# Patient Record
Sex: FEMALE | Race: WHITE
[De-identification: names, ages, dates, MRNs, and addresses within clinical notes are randomized per-mention and may not be internally consistent; named-entity substitution may affect disease eponyms.]

---

## 2021-08-18 ENCOUNTER — HOSPITAL ENCOUNTER (OUTPATIENT)
Dept: HOSPITAL 47 - RADNMMAIN | Age: 66
Discharge: HOME | End: 2021-08-18
Attending: FAMILY MEDICINE
Payer: MEDICARE

## 2021-08-18 DIAGNOSIS — E21.3: Primary | ICD-10-CM

## 2021-08-18 DIAGNOSIS — R92.1: ICD-10-CM

## 2021-08-18 DIAGNOSIS — R92.2: ICD-10-CM

## 2021-08-18 PROCEDURE — 77066 DX MAMMO INCL CAD BI: CPT

## 2021-08-18 PROCEDURE — 78071 PARATHYRD PLANAR W/WO SUBTRJ: CPT

## 2021-08-18 PROCEDURE — 77062 BREAST TOMOSYNTHESIS BI: CPT

## 2021-08-18 NOTE — NM
EXAMINATION TYPE: NM parathyroid w/spect

 

DATE OF EXAM: 8/18/2021

 

 COMPARISON: NONE

 

HISTORY: Hyperparathyroidism. Hypercalcemia. 

 

TECHNIQUE:

Following administration of 25.5 mCi Tc99m Sestamibi. Anterior projection images of the neck and ches
t were obtained 0 minutes and 3 hours post injection.  SPECT images of the neck and chest were obtain
ed and reconstructed in three axes.

 

FINDINGS:

 

Thyroid tracer washout: Delayed images demonstrate near-complete tracer washout from the thyroid.

 

Parathyroid uptake: Persistent suspicious radiotracer uptake lower pole of the right thyroid on delay
ed images.

 

Normal uptake: There is physiological tracer uptake in the salivary glands and thyroid glands initial
ly.

 

IMPRESSION: Probable lower pole right parathyroid adenoma. Consider ultrasound correlation.

## 2021-08-23 NOTE — MM
Reason for exam: additional evaluation requested from prior study.

Last mammogram was performed 2 years and 2 months ago.



History:

Patient is postmenopausal and has history of breast cancer at age 58.

Lumpectomy of the left breast, 2014.  Radiation therapy of the left breast, 2014. 

Malignant excisional biopsy of the left breast, 2013.

Took estrogen for 8 years.  Took antineoplastic for 5 years.



Physical Findings:

Nurse did not find any significant physical abnormalities on exam.



MG 3D Diag Mammo W/Cad ANJANA

Bilateral CC and MLO view(s) were taken.

Prior study comparison: June 6, 2019, mammogram, performed at [facility].  May 17, 

2018, mammogram, performed at [facility].  May 11, 2017, mammogram, performed at 

[facility].  October 10, 2016, mammogram, performed at [facility].  April 25, 2016, 

mammogram, performed at [facility].  February 12, 2015, mammogram, performed at 

[facility].  July 24, 2014, mammogram, performed at [facility].

The breast tissue is heterogeneously dense. This may lower the sensitivity of 

mammography.  Stable benign calcifications. Stable post operative lumpectomy 

changes left breast.

No significant new findings when compared with previous films.



These results were verbally communicated with the patient on 8/23/21.





ASSESSMENT: Benign, BI-RAD 2



RECOMMENDATION:

Follow-up diagnostic mammogram of both breasts in 1 year.

## 2021-11-10 ENCOUNTER — HOSPITAL ENCOUNTER (OUTPATIENT)
Dept: HOSPITAL 47 - RADUSWWP | Age: 66
Discharge: HOME | End: 2021-11-10
Payer: MEDICARE

## 2021-11-10 DIAGNOSIS — E04.2: Primary | ICD-10-CM

## 2021-11-10 PROCEDURE — 76536 US EXAM OF HEAD AND NECK: CPT

## 2021-11-10 NOTE — US
EXAMINATION TYPE: US thyroid st tissue head/neck

 

DATE OF EXAM: 11/10/2021

 

COMPARISON: NM 8/18/2021

 

CLINICAL HISTORY: 66-year-old female E83.52 Hypercalcemia.

 

TECHNIQUE: Multiple sonographic images of the thyroid gland are obtained.

 

FINDINGS:

 

GLAND SIZE:

 

Right Lobe: 3.9 x 1.2 x 1.7 cm

** Overall Parenchyma:  heterogenous

Left Lobe: 4.6 x 1.1 x 1.2 cm

** Overall Parenchyma:  heterogeneous

Isthmus Thickness:  0.4 cm

 

NODULES

 

RIGHT:   # of nodules measured on right: 1

1.  0.7 X 0.4  x 0.6 cm, mid mid, solid or almost completely solid, hypoechoic TR 4 nodule, which is 
wider than tall, with smooth margins, without echogenic foci.

 ** Prior size: no prior 

**Multiple other subcentimeter nodules, largest measured. 2 adjacent very hypoechoic nodules are pres
ent far posteriorly at the mid to lower pole measuring 7 mm and 5 mm.

 

LEFT:    # of nodules measured on left:  2

1.  1.0 X 0.6  x 0.7 cm, upper mid, solid or almost completely solid, hypoechoic TR 5 nodule, which i
s wider than tall, with smooth margins, with echogenic foci.

 ** Prior size: no prior 

 

2.  0.7 X 0.7  x 0.7 cm, lower mid,  solid or almost completely solid, hypoechoic TR 5 nodule, which 
is taller than wide, with smooth margins, with echogenic foci.

 ** Prior size: no prior 

 

 

ISTHMUS:    # of nodules measured in the isthmus:  0

 

Bilateral neck scanned, no evidence of lymphadenopathy.

 

Sonographer notes: Incidental note is made of paired parathyroid adenoma's right side measuring 0.7 x
 0.4 x 0.4 cm and 0.5 x 0.2 x 0.4 cm. 

 

 

 

IMPRESSION:

 

1. Possible paired, adjacent parathyroid adenomas posteriorly at the level of the mid to lower right 
thyroid lobe. This seems to correspond to the area of increased activity on the parathyroid scan.

2. A couple TR5 nodules in the left lobe measuring 10 mm and 7 mm. FNA can be considered for the left
-sided 10 mm nodule. The other nodules should be followed.

## 2022-11-08 ENCOUNTER — HOSPITAL ENCOUNTER (OUTPATIENT)
Dept: HOSPITAL 47 - RADMAMWWP | Age: 67
Discharge: HOME | End: 2022-11-08
Attending: FAMILY MEDICINE
Payer: MEDICARE

## 2022-11-08 DIAGNOSIS — Z12.2: Primary | ICD-10-CM

## 2022-11-08 DIAGNOSIS — Z78.0: ICD-10-CM

## 2022-11-08 DIAGNOSIS — Z19.1: ICD-10-CM

## 2022-11-08 DIAGNOSIS — R91.8: ICD-10-CM

## 2022-11-08 PROCEDURE — 77066 DX MAMMO INCL CAD BI: CPT

## 2022-11-08 PROCEDURE — 71271 CT THORAX LUNG CANCER SCR C-: CPT

## 2022-11-08 PROCEDURE — 77062 BREAST TOMOSYNTHESIS BI: CPT

## 2022-11-08 NOTE — MM
Reason for Exam: Follow-up at short interval from prior study. 

Last mammogram was performed 1 year(s) and 3 month(s) ago. 





Patient History: 

Menarche at age 10. First Full-Term Pregnancy at age 15. Hysterectomy at age 40. Postmenopausal.

Breast cancer, age 58. Previous chest radiation therapy at age 58. Patient used Estrogen for 8

years. 2013, Malignant Excisional Biopsy on the left side. 2014, Lumpectomy on the Left side. 2014,

Radiation Therapy on the left side. 





Prior Study Comparison: 

3/22/2005 Bilateral Screening Mammogram, [facility]. 7/24/2014  Screening Mammogram, [facility]. 2/12/2015

Screening Mammogram, [facility]. 4/25/2016  Screening Mammogram, [facility]. 10/10/2016  Screening

Mammogram, [facility]. 5/11/2017  Screening Mammogram, [facility]. 5/17/2018  Screening Mammogram,

[facility]. 6/6/2019  Screening Mammogram, [facility]. 8/18/2021 Bilateral Diagnostic Mammogram, [facility]. 





Tissue Density: 

There are scattered fibroglandular densities.





Findings: 

Analyzed By CAD. 

Pattern appears stable. Postsurgical changes are within the left breast. Postsurgical area contains

some punctate calcifications.

No suspicious groups of microcalcifications, spiculated or lobular masses, architectural distortion

or other secondary signs of malignancy are mammographically apparent. 





Overall Assessment: Benign, BI-RAD 2





Management: 

Screening Mammogram of both breasts in 1 year.

A negative mammogram report should not preclude additional follow up of suspicious palpable

abnormalities.

Patient should continue monthly self breast exam.

A clinical breast exam by your physician is recommended on an annual basis and results should be

correlated with mammographic findings.



Electronically signed and approved by: Spencer Dupont D.O. Radiologis

## 2022-11-08 NOTE — CTL
EXAMINATION TYPE:  CT Low Dose Lung

 

DATE OF EXAM ORDERED: 11/8/2022

 

HISTORY: . Lung cancer screening

 

CT DLP: 81.90 mGycm

CT CTDI: 2.20 mGy

Automated exposure control for dose reduction was used.

 

SCREENING VISIT:

 

COMPARISON: None

 

TECHNIQUE: Low dose computed tomography scan was performed through the chest at 1 mm thick sections a
nd reconstructed images in multiple planes at 1 mm and 5 mm thick sections.

 

CT DIAGNOSTIC QUALITY: Satisfactory

 

FINDINGS:

Lungs are clear. There is no pleural effusion or pneumothorax. No focal pneumonia. No pleural or pare
nchymal calcifications. Mild emphysematous changes noted.

 

There is a 2 mm subpleural nodule right upper lobe axial image 84.

 

Heart size normal. Aorta of normal caliber. No significant coronary artery calcification.

 

There is a nodule seen within the left breast with calcification measuring 1.2 cm.

 

Hypertrophic and degenerative changes of the vertebral count. There is a right upper pole renal lesio
n measuring 1.2 cm and 9 Hounsfield units. Findings compatible with a Bosniak classification 1 simple
 cyst.

 

 

 

 IMPRESSION: 

1. 2 mm nodule subpleural right upper lobe has a benign appearance. No suspicious appearing nodules.

2. There is a 1.2 cm nodule left breast with calcification recommend mammogram.

 

CT LUNG RAD AND CT CHEST RECOMMENDATION: Lung-Rad 2 Benign Appearance or Behavior: Continue annual sc
reening with LDCT in 12 months.

 

S Modifier (other clinically significant findings): S

## 2022-12-01 ENCOUNTER — HOSPITAL ENCOUNTER (OUTPATIENT)
Dept: HOSPITAL 47 - RADUSWWP | Age: 67
Discharge: HOME | End: 2022-12-01
Attending: INTERNAL MEDICINE
Payer: MEDICARE

## 2022-12-01 DIAGNOSIS — Z53.9: Primary | ICD-10-CM

## 2022-12-01 DIAGNOSIS — E04.2: Primary | ICD-10-CM

## 2022-12-01 PROCEDURE — 76536 US EXAM OF HEAD AND NECK: CPT

## 2022-12-01 NOTE — US
EXAMINATION TYPE: US thyroid st tissue head/neck

 

DATE OF EXAM: 12/1/2022

 

COMPARISON: 11/10/2021

 

CLINICAL HISTORY: 67-year-old female E04.1 NONTOXIC SINGLE THYROID NODULE. F/u exam

 

Technique: Multiple sonographic images of the thyroid gland are obtained.

 

FINDINGS:

 

GLAND SIZE:

 

Right Lobe: 3.4 x 1.3 x 1.6 cm

** Overall Parenchyma:  heterogenous

Left Lobe: 4.2 x 1.2 x 1.5 cm

** Overall Parenchyma:  heterogeneous

Isthmus Thickness:  0.3 cm

 

NODULES

 

RIGHT:   # of nodules measured on right: 2 - multiple nodules, measured largest

1.  0.7 X 0.7  x 0.5 cm, mid , solid or almost completely solid, hypoechoic TR4 nodule, which is wide
r than tall, with smooth margins, without echogenic foci.

 ** Prior size: 0.7 x 0.6  x 0.4 cm 

 

2.  0.8 X 0.9  x 0.8 cm, Inferior to thyroid gland  cystic or almost completely cystic, anechoic nodu
le, which is wider than tall, with smooth margins, with echogenic foci. Suspected colloid cyst.

 ** Prior size: 1.2 x 0.7  x 0.4 cm - previously measured as 2 separate, I combined measurements toda
y

 

LEFT:    # of nodules measured on left: 1 - measured largest

1.  1.1 X 0.8  x 0.6 cm, upper , solid or almost completely solid, hypoechoic TR4 nodule, which is wi
lo than tall, with smooth margins, without echogenic foci.

 ** Prior size: 1.0 x 0.7  x 0.5 cm 

 

 

ISTHMUS:    # of nodules measured in the isthmus:  0

 

Bilateral neck scanned, no evidence of lymphadenopathy.

 

 

 

IMPRESSION:

Consider multinodular goiter. Dominant nodules are measured. The 7 mm TR4 nodule at the right midpole
 is unchanged. At the left upper pole, the 1.1 cm here for nodule is also relatively unchanged. Ongoi
ng follow-up as clinically indicated.

## 2022-12-01 NOTE — MM
Reason for Exam: Additional evaluation requested from abnormal screening. 

Last screening mammogram was performed less than 1 month ago.





Patient History: 

Menarche at age 10. First Full-Term Pregnancy at age 15. Hysterectomy at age 40. Postmenopausal.

Patient has history of breast feeding. Breast cancer, left, age 58. Previous chest radiation therapy

at age 58. Patient used Estrogen for 8 years. 2013, Malignant Excisional Biopsy on the left side.

2014, Lumpectomy on the Left side. 2014, Radiation Therapy on the left side. 





Prior Study Comparison: 

3/22/2005 Bilateral Screening Mammogram, [facility]. 7/24/2014  Screening Mammogram, [facility]. 2/12/2015

Screening Mammogram, [facility]. 4/25/2016  Screening Mammogram, [facility]. 10/10/2016  Screening

Mammogram, [facility]. 5/11/2017  Screening Mammogram, [facility]. 5/17/2018  Screening Mammogram,

[facility]. 6/6/2019  Screening Mammogram, [facility]. 8/18/2021 Bilateral Diagnostic Mammogram, [facility].

11/8/2022 Bilateral MG 3D diag mammo w/cad ANJANA, [facility]. 





Tissue Density: 

Left: There are scattered fibroglandular densities.





Findings: 

Analyzed By CAD. 

Redemonstrated 12:00 posterior depth postsurgical scar. There are increasing microcalcifications at

this site. On the lateral view, these appear coarse and heterogeneous but on the mag CC view, we

suspect early developing fat necrosis calcifications. Six-month follow-up mammogram recommended to

reassess. Patient being reevaluated due to incidental abnormality seen on a CT. 





Overall Assessment: Probably benign, BI-RAD 3





Management: 

Diagnostic Mammogram of the left breast in 6 months.

1. Patient should continue monthly self breast exams.

2. A clinical breast exam by your physician is recommended on an annual basis.

3. This exam should not preclude additional follow-up of suspicious palpable abnormalities.



Results were given to the patient verbally at the time of exam.



Electronically signed and approved by: Carlito Ledesma M.D. Radiologist

## 2023-06-06 ENCOUNTER — HOSPITAL ENCOUNTER (OUTPATIENT)
Dept: HOSPITAL 47 - RADMAMWWP | Age: 68
Discharge: HOME | End: 2023-06-06
Attending: INTERNAL MEDICINE
Payer: MEDICARE

## 2023-06-06 DIAGNOSIS — N63.21: Primary | ICD-10-CM

## 2023-06-06 PROCEDURE — 77066 DX MAMMO INCL CAD BI: CPT

## 2023-06-06 PROCEDURE — 77062 BREAST TOMOSYNTHESIS BI: CPT

## 2023-06-06 NOTE — MM
Reason for Exam: Follow-up at short interval from prior study. 

Last screening mammogram was performed 7 month(s) ago.





Patient History: 

Menarche at age 10. First Full-Term Pregnancy at age 15. Left ovary removed at age 40. Right ovary

removed at age 40. Hysterectomy at age 40. Postmenopausal. Patient has history of breast feeding.

Breast cancer, left, age 58. Previous chest radiation therapy at age 58. Patient used Estrogen for 8

years. 2013, Malignant Excisional Biopsy on the left side. 2014, Lumpectomy on the Left side. 2014,

Radiation Therapy on the left side. 





Prior Study Comparison: 

7/24/2014  Screening Mammogram, [facility]. 2/12/2015  Screening Mammogram, [facility]. 4/25/2016

Screening Mammogram, [facility]. 10/10/2016  Screening Mammogram, [facility]. 5/11/2017  Screening

Mammogram, [facility]. 5/17/2018  Screening Mammogram, [facility]. 6/6/2019  Screening Mammogram,

[facility]. 8/18/2021 Bilateral Diagnostic Mammogram, [facility]. 11/8/2022 Bilateral MG 3D diag mammo w/cad

ANJANA, [facility]. 12/1/2022 Left MG 3D follow up no charge , [facility]. 





Tissue Density: 

There are scattered fibroglandular densities.





Findings: 

Analyzed By CAD. 

Postsurgical and posttreatment change left breast. Lumpectomy scar at 12:00 left breast with

associated fat necrosis calcifications now. Benign oil cyst calcification centrally on the right.

Significant change from prior exams. 





Overall Assessment: Benign, BI-RAD 2





Management: 

Screening Mammogram of both breasts in 1 year.

Results were given to the patient verbally at the time of exam.



Patient should continue monthly self-breast exams.  A clinical breast exam by your physician is

recommended on an annual basis.

This exam should not preclude additional follow-up of suspicious palpable abnormalities.



Electronically signed and approved by: Carlito Ledesma M.D. Radiologist

## 2024-08-01 ENCOUNTER — HOSPITAL ENCOUNTER (OUTPATIENT)
Dept: HOSPITAL 47 - RADMAMWWP | Age: 69
Discharge: HOME | End: 2024-08-01
Attending: FAMILY MEDICINE
Payer: MEDICARE

## 2024-08-01 DIAGNOSIS — C50.012: Primary | ICD-10-CM

## 2024-08-01 DIAGNOSIS — Z78.0: ICD-10-CM

## 2024-08-01 DIAGNOSIS — R92.323: ICD-10-CM

## 2024-08-01 PROCEDURE — 77062 BREAST TOMOSYNTHESIS BI: CPT

## 2024-08-01 PROCEDURE — 77066 DX MAMMO INCL CAD BI: CPT

## 2024-08-01 NOTE — MM
Reason for Exam: Hx of breast cancer, conservation therapy. 

Last mammogram was performed 1 year(s) and 2 month(s) ago. 





Patient History: 

Menarche at age 10. First Full-Term Pregnancy at age 15. Left ovary removed at age 40. Right ovary

removed at age 40. Hysterectomy at age 40. Postmenopausal. Patient has history of breast feeding.

Breast cancer, left, age 58. Previous chest radiation therapy at age 58. Patient used Estrogen for 8

years. 2013, Malignant Excisional Biopsy on the left side. 2014, Lumpectomy on the Left side. 2014,

Radiation Therapy on the left side. 





Prior Study Comparison: 

5/11/2017  Screening Mammogram, [facility]. 6/6/2019  Screening Mammogram, [facility]. 11/8/2022 Bilateral MG 3D diag mammo w/cad ANJANA, [facility]. 12/1/2022 Left MG 3D follow

up no charge , [facility]. 6/6/2023 Bilateral MG 3D diag mammo w/cad ANJANA, [facility]. 





Tissue Density: 

There are scattered areas of fibroglandular density.





Findings: 

Analyzed By CAD. 

Postoperative changes of lumpectomy left breast. No significant interval change. No evidence for

recurrent mass. No new masses seen. No suspicious microcalcifications within either breast. 





Overall Assessment: Benign, BI-RAD 2





Management: 

Screening Mammogram of both breasts in 1 year.

.  Results were given to the patient verbally at the time of exam.



Patient should continue monthly self-breast exams.  A clinical breast exam by your physician is

recommended on an annual basis.

This exam should not preclude additional follow-up of suspicious palpable abnormalities.





Note on Abida scores and lifetime risk:

1. A Abida score greater than 3% is considered moderate risk. If this is the case, consider

specialist referral to assess eligibility for a risk reducing agent.

2. If overall lifetime risk for the development of breast cancer is 20% or higher, the patient may

qualify for future screening with alternating mammogram and breast MRI.



Electronically signed and approved by: Leopold M. Fregoli, M.D. Radiologis

## 2024-11-18 ENCOUNTER — HOSPITAL ENCOUNTER (OUTPATIENT)
Dept: HOSPITAL 47 - PROCWHC3 | Age: 69
End: 2024-11-18
Attending: FAMILY MEDICINE
Payer: MEDICARE

## 2024-11-18 VITALS
TEMPERATURE: 98.3 F | HEART RATE: 87 BPM | SYSTOLIC BLOOD PRESSURE: 133 MMHG | RESPIRATION RATE: 16 BRPM | DIASTOLIC BLOOD PRESSURE: 89 MMHG

## 2024-11-18 DIAGNOSIS — M81.0: Primary | ICD-10-CM

## 2024-11-18 PROCEDURE — 96372 THER/PROPH/DIAG INJ SC/IM: CPT

## 2024-11-18 RX ADMIN — DENOSUMAB NR MG: 60 INJECTION SUBCUTANEOUS at 13:10

## 2025-05-21 ENCOUNTER — HOSPITAL ENCOUNTER (OUTPATIENT)
Dept: HOSPITAL 47 - PROCWHC3 | Age: 70
End: 2025-05-21
Attending: FAMILY MEDICINE
Payer: MEDICARE

## 2025-05-21 VITALS
RESPIRATION RATE: 16 BRPM | SYSTOLIC BLOOD PRESSURE: 158 MMHG | DIASTOLIC BLOOD PRESSURE: 90 MMHG | HEART RATE: 62 BPM | TEMPERATURE: 98.1 F

## 2025-05-21 DIAGNOSIS — M81.0: Primary | ICD-10-CM

## 2025-05-21 PROCEDURE — 96372 THER/PROPH/DIAG INJ SC/IM: CPT

## 2025-05-21 RX ADMIN — DENOSUMAB NR MG: 60 INJECTION SUBCUTANEOUS at 11:25
